# Patient Record
Sex: MALE | Race: WHITE | Employment: FULL TIME | URBAN - METROPOLITAN AREA
[De-identification: names, ages, dates, MRNs, and addresses within clinical notes are randomized per-mention and may not be internally consistent; named-entity substitution may affect disease eponyms.]

---

## 2017-03-01 PROBLEM — N40.1 BENIGN PROSTATIC HYPERPLASIA WITH LOWER URINARY TRACT SYMPTOMS: Status: ACTIVE | Noted: 2017-03-01

## 2020-02-21 ENCOUNTER — HOSPITAL ENCOUNTER (OUTPATIENT)
Dept: LAB | Age: 55
Discharge: HOME | End: 2020-02-21

## 2020-02-21 PROCEDURE — 88305 TISSUE EXAM BY PATHOLOGIST: CPT

## 2022-03-19 PROBLEM — N40.1 BENIGN PROSTATIC HYPERPLASIA WITH LOWER URINARY TRACT SYMPTOMS: Status: ACTIVE | Noted: 2017-03-01

## 2022-06-30 ENCOUNTER — OFFICE VISIT (OUTPATIENT)
Dept: UROLOGY | Age: 57
End: 2022-06-30
Payer: COMMERCIAL

## 2022-06-30 DIAGNOSIS — R97.20 ELEVATED PSA: Primary | ICD-10-CM

## 2022-06-30 DIAGNOSIS — N40.1 BENIGN PROSTATIC HYPERPLASIA WITH URINARY OBSTRUCTION: ICD-10-CM

## 2022-06-30 DIAGNOSIS — N13.8 BENIGN PROSTATIC HYPERPLASIA WITH URINARY OBSTRUCTION: ICD-10-CM

## 2022-06-30 LAB
BILIRUBIN, URINE, POC: NEGATIVE
BLOOD URINE, POC: NEGATIVE
GLUCOSE URINE, POC: NEGATIVE
KETONES, URINE, POC: NEGATIVE
LEUKOCYTE ESTERASE, URINE, POC: NEGATIVE
NITRITE, URINE, POC: NEGATIVE
PH, URINE, POC: 6.5 (ref 4.6–8)
PROTEIN,URINE, POC: NEGATIVE
SPECIFIC GRAVITY, URINE, POC: 1.01 (ref 1–1.03)
URINALYSIS CLARITY, POC: NORMAL
URINALYSIS COLOR, POC: NORMAL
UROBILINOGEN, POC: NORMAL

## 2022-06-30 PROCEDURE — 81003 URINALYSIS AUTO W/O SCOPE: CPT | Performed by: UROLOGY

## 2022-06-30 PROCEDURE — 99204 OFFICE O/P NEW MOD 45 MIN: CPT | Performed by: UROLOGY

## 2022-06-30 RX ORDER — DEXTROAMPHETAMINE SACCHARATE, AMPHETAMINE ASPARTATE, DEXTROAMPHETAMINE SULFATE AND AMPHETAMINE SULFATE 3.75; 3.75; 3.75; 3.75 MG/1; MG/1; MG/1; MG/1
15 TABLET ORAL 3 TIMES DAILY
COMMUNITY
Start: 2022-05-03

## 2022-06-30 RX ORDER — OMEPRAZOLE 40 MG/1
40 CAPSULE, DELAYED RELEASE ORAL 2 TIMES DAILY
COMMUNITY
Start: 2022-02-25

## 2022-06-30 ASSESSMENT — ENCOUNTER SYMPTOMS
EYES NEGATIVE: 1
RESPIRATORY NEGATIVE: 1

## 2022-06-30 NOTE — PROGRESS NOTES
[facility]  [address] [facility]    [address]  [facility], [address] [facility]  [phone]          Kellen Marvin  : 1965    Chief Complaint   Patient presents with    Elevated PSA          HPI     Kellen Marvin is a 55 y.o. male    PSA 0.2017  PSA 2.3 2018  PSA 2.2018  PSA 1.2019  PSA 1.5 2021  PSA 3.0 2022    The patient comes to us today due to concerns about his PSA. Apparently prior to the  value there was a PSA of around 4 but we cannot find a confirmation of this. There is no family history of prostate cancer. He does have BPH with lower urinary tract symptoms but these have responded quite well to Flomax and Cialis. The Cialis helps with erectile dysfunction as well. He does have some mild frequency and nocturia x1 and rarely x2. Past Medical History:   Diagnosis Date    Allergic rhinitis     Asthma     BPH (benign prostatic hyperplasia)     GERD (gastroesophageal reflux disease)     Hypercholesterolemia     Hypertension     Melanoma ([address] 2018    excised, no other tx     Past Surgical History:   Procedure Laterality Date    APPENDECTOMY      HEENT      Uvulopalatopharyngoplasty     UPPER GASTROINTESTINAL ENDOSCOPY  2000     Current Outpatient Medications   Medication Sig Dispense Refill    amphetamine-dextroamphetamine (ADDERALL) 15 MG tablet Take 15 mg by mouth 3 times daily.  omeprazole (PRILOSEC) 40 MG delayed release capsule Take 40 mg by mouth 2 times daily      atorvastatin (LIPITOR) 40 MG tablet TAKE 1 TABLET DAILY      budesonide-formoterol (SYMBICORT) 160-4.5 MCG/ACT AERO Inhale 2 puffs into the lungs 2 times daily      DULoxetine (CYMBALTA) 60 MG extended release capsule Take 60 mg by mouth daily      lisinopril (PRINIVIL;ZESTRIL) 5 MG tablet Take 5 mg by mouth daily      LORazepam (ATIVAN) 0.5 MG tablet Take 0.5 mg by mouth daily as needed.       tamsulosin (FLOMAX) 0.4 MG capsule Take 0.4 mg by mouth daily      levocetirizine (XYZAL) 5 MG tablet Take 5 mg by mouth daily (Patient not taking: Reported on 6/30/2022)      pantoprazole (PROTONIX) 40 MG tablet Take 40 mg by mouth 2 times daily (Patient not taking: Reported on 6/30/2022)       No current facility-administered medications for this visit. Allergies   Allergen Reactions    Fexofenadine Rash     Social History     Socioeconomic History    Marital status: Single     Spouse name: Not on file    Number of children: Not on file    Years of education: Not on file    Highest education level: Not on file   Occupational History    Not on file   Tobacco Use    Smoking status: Former Smoker    Smokeless tobacco: Never Used   Substance and Sexual Activity    Alcohol use: No    Drug use: No    Sexual activity: Not on file   Other Topics Concern    Not on file   Social History Narrative    Not on file     Social Determinants of Health     Financial Resource Strain:     Difficulty of Paying Living Expenses: Not on file   Food Insecurity:     Worried About [address] [employer] in the Last Year: Not on file    Elian of Food in the Last Year: Not on file   Transportation Needs:     Lack of Transportation (Medical): Not on file    Lack of Transportation (Non-Medical):  Not on file   Physical Activity:     Days of Exercise per Week: Not on file    Minutes of Exercise per Session: Not on file   Stress:     Feeling of Stress : Not on file   Social Connections:     Frequency of Communication with Friends and Family: Not on file    Frequency of Social Gatherings with Friends and Family: Not on file    Attends [employer] Services: Not on file    Active Member of Clubs or Organizations: Not on file    Attends Club or Organization Meetings: Not on file    Marital Status: Not on file   Intimate Partner Violence:     Fear of Current or Ex-Partner: Not on file    Emotionally Abused: Not on file    Physically Abused: Not on file   Bárbara See Sexually Abused: Not on file   Housing Stability:     Unable to Pay for Housing in the Last Year: Not on file    Number of Places Lived in the Last Year: Not on file    Unstable Housing in the Last Year: Not on file     Family History   Adopted: Yes   Problem Relation Age of Onset    Cataracts Mother     No Known Problems Sister     Macular Degen Mother        Review of Systems  Constitutional: Negative  Skin: Negative skin ROS  Eyes: Eyes negative  ENT: HENT negative  Respiratory: Respiratory negative  Cardiovascular: Positive for hypertension. GI: Neg GI ROS  Genitourinary: Positive for nocturia, straining, urgency and frequent urination. Musculoskeletal: Musculoskeletal negative  Neurological: Neg neuro ROS  Psychological: Neg psych ROS  Endocrine: Positive for excessive urination. Hem/Lymphatic: Hematologic/lymphatic negative    The patient is afebrile stable vital signs. The abdomen is soft and nontender. There is no palpable mass. Extremities are without cyanosis or edema. The patient is alert and oriented x3 there is no focal neurologic deficit. Phallus is unremarkable. Both testicles are descended and normal in size, shape, consistency. Rectal exam reveals a moderately large gland without any asymmetry, nodule, or induration.     Urinalysis  UA - Dipstick  Results for orders placed or performed in visit on 06/30/22   AMB POC URINALYSIS DIP STICK AUTO W/O MICRO   Result Value Ref Range    Color (UA POC)      Clarity (UA POC)      Glucose, Urine, POC Negative Negative    Bilirubin, Urine, POC Negative Negative    KETONES, Urine, POC Negative Negative    Specific Gravity, Urine, POC 1.010 1.001 - 1.035    Blood (UA POC) Negative Negative    pH, Urine, POC 6.5 4.6 - 8.0    Protein, Urine, POC Negative Negative    Urobilinogen, POC Normal     Nitrite, Urine, POC Negative Negative    Leukocyte Esterase, Urine, POC Negative Negative       UA - Micro  WBC - 0  RBC - 0  Bacteria - 0  Epith - 0        Assessment and Plan    ICD-10-CM    1. Elevated PSA  R97.20 AMB POC URINALYSIS DIP STICK AUTO W/O MICRO   2. Benign prostatic hyperplasia with urinary obstruction  N40.1     N13.8      If he truly had a PSA of around 4 that would be just above the normal range at 3.5 for a male 50-60. I will obtain an MRI of the pelvis and he will call for these results.

## 2022-07-19 ENCOUNTER — HOSPITAL ENCOUNTER (OUTPATIENT)
Dept: MRI IMAGING | Age: 57
Discharge: HOME | End: 2022-07-22
Payer: COMMERCIAL

## 2022-07-19 DIAGNOSIS — R97.20 ELEVATED PSA: ICD-10-CM

## 2022-07-19 PROCEDURE — A9579 GAD-BASE MR CONTRAST NOS,1ML: HCPCS | Performed by: UROLOGY

## 2022-07-19 PROCEDURE — 6360000004 HC RX CONTRAST MEDICATION: Performed by: UROLOGY

## 2022-07-19 PROCEDURE — 2580000003 HC RX 258: Performed by: UROLOGY

## 2022-07-19 PROCEDURE — 72197 MRI PELVIS W/O & W/DYE: CPT

## 2022-07-19 RX ORDER — SODIUM CHLORIDE 0.9 % (FLUSH) 0.9 %
30 SYRINGE (ML) INJECTION AS NEEDED
Status: DISCONTINUED | OUTPATIENT
Start: 2022-07-19 | End: 2022-07-23 | Stop reason: HOSPADM

## 2022-07-19 RX ADMIN — SODIUM CHLORIDE, PRESERVATIVE FREE 30 ML: 5 INJECTION INTRAVENOUS at 14:06

## 2022-07-19 RX ADMIN — GADOTERIDOL 17 ML: 279.3 INJECTION, SOLUTION INTRAVENOUS at 14:06

## 2022-08-04 ENCOUNTER — TELEPHONE (OUTPATIENT)
Dept: UROLOGY | Age: 57
End: 2022-08-04

## 2022-08-08 PROBLEM — R97.20 ELEVATED PSA: Status: ACTIVE | Noted: 2022-08-08

## 2022-08-15 ENCOUNTER — TELEPHONE (OUTPATIENT)
Dept: UROLOGY | Age: 57
End: 2022-08-15